# Patient Record
Sex: MALE | Race: WHITE | Employment: FULL TIME | URBAN - METROPOLITAN AREA
[De-identification: names, ages, dates, MRNs, and addresses within clinical notes are randomized per-mention and may not be internally consistent; named-entity substitution may affect disease eponyms.]

---

## 2020-12-26 ENCOUNTER — HOSPITAL ENCOUNTER (EMERGENCY)
Facility: CLINIC | Age: 60
Discharge: HOME | End: 2020-12-26
Attending: EMERGENCY MEDICINE | Admitting: EMERGENCY MEDICINE
Payer: COMMERCIAL

## 2020-12-26 ENCOUNTER — APPOINTMENT (OUTPATIENT)
Dept: GENERAL RADIOLOGY | Facility: CLINIC | Age: 60
End: 2020-12-26
Attending: EMERGENCY MEDICINE
Payer: COMMERCIAL

## 2020-12-26 VITALS
DIASTOLIC BLOOD PRESSURE: 77 MMHG | SYSTOLIC BLOOD PRESSURE: 150 MMHG | RESPIRATION RATE: 20 BRPM | TEMPERATURE: 97.3 F | HEART RATE: 47 BPM | OXYGEN SATURATION: 98 % | WEIGHT: 233.69 LBS

## 2020-12-26 DIAGNOSIS — R07.9 CHEST PAIN, UNSPECIFIED TYPE: ICD-10-CM

## 2020-12-26 DIAGNOSIS — R00.1 BRADYCARDIA: ICD-10-CM

## 2020-12-26 LAB
ALBUMIN SERPL-MCNC: 3.5 G/DL (ref 3.4–5)
ALP SERPL-CCNC: 86 U/L (ref 40–150)
ALT SERPL W P-5'-P-CCNC: 41 U/L (ref 0–70)
ANION GAP SERPL CALCULATED.3IONS-SCNC: 2 MMOL/L (ref 3–14)
AST SERPL W P-5'-P-CCNC: 27 U/L (ref 0–45)
BASOPHILS # BLD AUTO: 0.1 10E9/L (ref 0–0.2)
BASOPHILS NFR BLD AUTO: 0.8 %
BILIRUB SERPL-MCNC: 0.5 MG/DL (ref 0.2–1.3)
BUN SERPL-MCNC: 16 MG/DL (ref 7–30)
CALCIUM SERPL-MCNC: 8.9 MG/DL (ref 8.5–10.1)
CHLORIDE SERPL-SCNC: 108 MMOL/L (ref 94–109)
CO2 SERPL-SCNC: 29 MMOL/L (ref 20–32)
CREAT SERPL-MCNC: 0.92 MG/DL (ref 0.66–1.25)
DIFFERENTIAL METHOD BLD: NORMAL
EOSINOPHIL # BLD AUTO: 0.2 10E9/L (ref 0–0.7)
EOSINOPHIL NFR BLD AUTO: 3.2 %
ERYTHROCYTE [DISTWIDTH] IN BLOOD BY AUTOMATED COUNT: 11.9 % (ref 10–15)
GFR SERPL CREATININE-BSD FRML MDRD: 88 ML/MIN/{1.73_M2}
GLUCOSE SERPL-MCNC: 103 MG/DL (ref 70–99)
HCT VFR BLD AUTO: 43.6 % (ref 40–53)
HGB BLD-MCNC: 14.7 G/DL (ref 13.3–17.7)
IMM GRANULOCYTES # BLD: 0 10E9/L (ref 0–0.4)
IMM GRANULOCYTES NFR BLD: 0.3 %
LYMPHOCYTES # BLD AUTO: 1.7 10E9/L (ref 0.8–5.3)
LYMPHOCYTES NFR BLD AUTO: 25.3 %
MAGNESIUM SERPL-MCNC: 2.2 MG/DL (ref 1.6–2.3)
MCH RBC QN AUTO: 33 PG (ref 26.5–33)
MCHC RBC AUTO-ENTMCNC: 33.7 G/DL (ref 31.5–36.5)
MCV RBC AUTO: 98 FL (ref 78–100)
MONOCYTES # BLD AUTO: 0.5 10E9/L (ref 0–1.3)
MONOCYTES NFR BLD AUTO: 8.2 %
NEUTROPHILS # BLD AUTO: 4.1 10E9/L (ref 1.6–8.3)
NEUTROPHILS NFR BLD AUTO: 62.2 %
NRBC # BLD AUTO: 0 10*3/UL
NRBC BLD AUTO-RTO: 0 /100
PLATELET # BLD AUTO: 238 10E9/L (ref 150–450)
POTASSIUM SERPL-SCNC: 3.7 MMOL/L (ref 3.4–5.3)
PROT SERPL-MCNC: 6.9 G/DL (ref 6.8–8.8)
RBC # BLD AUTO: 4.45 10E12/L (ref 4.4–5.9)
SODIUM SERPL-SCNC: 139 MMOL/L (ref 133–144)
TROPONIN I SERPL-MCNC: <0.015 UG/L (ref 0–0.04)
TROPONIN I SERPL-MCNC: <0.015 UG/L (ref 0–0.04)
WBC # BLD AUTO: 6.6 10E9/L (ref 4–11)

## 2020-12-26 PROCEDURE — 85025 COMPLETE CBC W/AUTO DIFF WBC: CPT | Performed by: EMERGENCY MEDICINE

## 2020-12-26 PROCEDURE — 99285 EMERGENCY DEPT VISIT HI MDM: CPT | Mod: 25

## 2020-12-26 PROCEDURE — 93005 ELECTROCARDIOGRAM TRACING: CPT | Mod: 76

## 2020-12-26 PROCEDURE — 84484 ASSAY OF TROPONIN QUANT: CPT | Performed by: EMERGENCY MEDICINE

## 2020-12-26 PROCEDURE — 71045 X-RAY EXAM CHEST 1 VIEW: CPT

## 2020-12-26 PROCEDURE — 93005 ELECTROCARDIOGRAM TRACING: CPT

## 2020-12-26 PROCEDURE — 83735 ASSAY OF MAGNESIUM: CPT | Performed by: EMERGENCY MEDICINE

## 2020-12-26 PROCEDURE — 80053 COMPREHEN METABOLIC PANEL: CPT | Performed by: EMERGENCY MEDICINE

## 2020-12-26 RX ORDER — LISINOPRIL 10 MG/1
10 TABLET ORAL
COMMUNITY
Start: 2020-12-04

## 2020-12-26 ASSESSMENT — ENCOUNTER SYMPTOMS
LIGHT-HEADEDNESS: 0
SHORTNESS OF BREATH: 0
BACK PAIN: 0
DIZZINESS: 0

## 2020-12-26 NOTE — ED AVS SNAPSHOT
[facility] Emergency Dept  [address]  [facility] [address]  Phone: [phone]  Fax: [phone]                                    Andre Ariza   MRN: [id number]    Department: [facility] Emergency Dept   Date of Visit: 12/26/2020           After Visit Summary Signature Page    I have received my discharge instructions, and my questions have been answered. I have discussed any challenges I see with this plan with the nurse or doctor.    ..........................................................................................................................................  Patient/Patient Representative Signature      ..........................................................................................................................................  Patient Representative Print Name and Relationship to Patient    ..................................................               ................................................  Date                                   Time    ..........................................................................................................................................  Reviewed by Signature/Title    ...................................................              ..............................................  Date                                               Time          22EPIC Rev 08/18

## 2020-12-26 NOTE — ED PROVIDER NOTES
History   Chief Complaint:  Chest Pain       HPI   Andre Ariza is a 60 year old male who presents with chest pain. The patient states he has sitting in a chair playing with his dog this morning about an hour prior to arrival when he had a brief second of chest pain. He says he was shoveling snow for a few hours yesterday and had no discomfort then. He did not take anything for pain, but he did take his blood pressure medication this morning. He says he has always had chest pain and remembers experiencing it even as a kid, and he states this feels like a muscle pull to him. He notes he did check his blood pressure after the incident and noticed it was slightly elevated. The patient denies leg swelling, shortness of breath, chest pain with exertion, dizziness, light-headedness, or back pain.      Review of Systems   Respiratory: Negative for shortness of breath.    Cardiovascular: Positive for chest pain. Negative for leg swelling.   Musculoskeletal: Negative for back pain.   Neurological: Negative for dizziness and light-headedness.   All other systems reviewed and are negative.      Allergies:  The patient has no known allergies.       Medications:  Lisinopril      Past Medical History:    History of adenomatous colonic polyps  Onychomycosis  History of basal cell carcinoma of skin  Hypertrophic and atrophic condition of skin  Tobacco use       Past Surgical History:    Knee surgery, right       Family History:    Hyperlipidemia  Hypertension      Social History:  The patient was unaccompanied to the ED. 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  Denies drug use. Notes social alcohol use.    Physical Exam     Patient Vitals for the past 24 hrs:   BP Temp Temp src Pulse Resp SpO2 Weight   12/26/20 1245 -- -- -- -- -- 97 % --   12/26/20 1115 -- -- -- (!) 47 -- 98 % --   12/26/20 1100 (!) 165/95 -- -- (!) 47 -- 98 % --   12/26/20 1045 (!) 162/97 -- -- (!) 49 -- 97 % --   12/26/20 1030 (!) 167/108 -- --  -- -- -- --   20 1005 (!) 165/104 -- -- -- -- -- --   20 1004 -- 97.3  F (36.3  C) Oral 53 20 98 % 106 kg (233 lb 11 oz)       Physical Exam  Constitutional: Alert, attentive, GCS 15  HENT:    Nose: Nose normal.    Mouth/Throat: Oropharynx is clear, mucous membranes are moist  Eyes: EOM are normal, anicteric, conjugate gaze  CV: regular rate and rhythm; no murmurs  Chest: Effort normal and breath sounds clear without wheezing or rales, symmetric bilaterally   GI:  non tender. No distension. No guarding or rebound.    MSK: No LE edema, no tenderness to palpation of BLE.  Neurological: Alert, attentive, moving all extremities equally.   Skin: Skin is warm and dry.    Emergency Department Course     ECG:  ECG taken at 1019, ECG read at 1023  Sinus bradycardia with sinus arrhythmia  U-wave  Otherwise normal ECG  Rate 47 bpm. NH interval 194 ms. QRS duration 104 ms. QT/QTc 416/368 ms. P-R-T axes 21 -4 31.    ECG:  ECG taken at 1310, ECG read at 1314  Sinus bradycardia  Otherwise normal ECG  Rate 47 bpm. NH interval 184 ms. QRS duration 106 ms. QT/QTc 424/375 ms. P-R-T axes 21 -10 20.    Imaging:  XR Chest Port 1 View  No acute disease.    JONAS BARTH MD      Laboratory:  CBC: WBC 6.6, HGB 14.7,    CMP: Anion gap 2 (L), Glucose 103 (H) (Creatinine: 0.92)    Troponin (Collected 1040): <0.015  Troponin (Collected 1245): <0.015    Magnesium: 2.2      Emergency Department Course:    Reviewed:  1031: I reviewed nursing notes, vitals, past medical history and care everywhere    Assessments:  1038: I performed an exam of the patient as noted above.  1249: I rechecked the patient.    Disposition:  1334: The patient was discharged to home.     Impression & Plan     Medical Decision Makin-year-old male past medical history significant for hypertension presenting for evaluation of noncardiac sounding brief lasting 2 seconds central chest pain.  At time of his evaluation he is pain-free, no clear exertional  symptoms with serial EKGs and serial troponins negative for any pattern of injury.  EKG shows a sinus bradycardia though he has no dizziness or lightheadedness with ambulation.  Chest x-ray is clear, he only had 1 to 2 seconds of pain with low suspicion for aortic dissection. He has no SOB, pleuritic component or hypoxia to suggest PE and has no risk factors or sx of DVT.  He reports a longstanding history of chest pain dating back even in childhood, he had a negative stress echo 10 years ago however given his exceedingly atypical nature here and being a low risk by heart score, I do feel he is safe for discharge home with plan for outpatient follow-up and likely repeat stress test.  Return precautions were reviewed and he is discharged home.    Diagnosis:    ICD-10-CM    1. Chest pain, unspecified type  R07.9    2. Bradycardia  R00.1        Anand Rodriguez MD  [employer]  4:14 PM 12/26/20     Scribe Disclosure:  I, Sue Maria, am serving as a scribe at 10:38 AM on 12/26/2020 to document services personally performed by Anand Rodriguez MD based on my observations and the provider's statements to me.        Anand Rodriguez MD  12/26/20 [id number]

## 2020-12-26 NOTE — DISCHARGE INSTRUCTIONS
You should continue to take your medications as prescribed.  You should return the emergency room should you have recurrence of your chest pain especially if this is with exertion, associate with nausea or sweating or pain radiating to your jaw or arm.  Otherwise, he should make an appointment to follow-up with your primary doctor and discuss the need for another outpatient stress test.

## 2020-12-27 LAB
INTERPRETATION ECG - MUSE: NORMAL
INTERPRETATION ECG - MUSE: NORMAL

## 2023-06-10 NOTE — ED TRIAGE NOTES
Pt had episode of sternal chest pain this AM which lasted approx 2 seconds.   1 Principal Discharge DX:	Pain, dental